# Patient Record
(demographics unavailable — no encounter records)

---

## 2024-12-18 NOTE — PHYSICAL EXAM
[Alert] : alert [No Acute Distress] : no acute distress [Normocephalic] : normocephalic [Anterior [place] Closed] : anterior fontanelle closed [Red Reflex Bilateral] : red reflex bilateral [PERRL] : PERRL [Normally Placed Ears] : normally placed ears [Auricles Well Formed] : auricles well formed [Clear Tympanic membranes with present light reflex and bony landmarks] : clear tympanic membranes with present light reflex and bony landmarks [No Discharge] : no discharge [Nares Patent] : nares patent [Palate Intact] : palate intact [Uvula Midline] : uvula midline [Tooth Eruption] : tooth eruption  [Supple, full passive range of motion] : supple, full passive range of motion [No Palpable Masses] : no palpable masses [Symmetric Chest Rise] : symmetric chest rise [Clear to Auscultation Bilaterally] : clear to auscultation bilaterally [Regular Rate and Rhythm] : regular rate and rhythm [S1, S2 present] : S1, S2 present [No Murmurs] : no murmurs [+2 Femoral Pulses] : +2 femoral pulses [Soft] : soft [NonTender] : non tender [Non Distended] : non distended [Normoactive Bowel Sounds] : normoactive bowel sounds [No Hepatomegaly] : no hepatomegaly [No Splenomegaly] : no splenomegaly [Central Urethral Opening] : central urethral opening [Testicles Descended Bilaterally] : testicles descended bilaterally [Patent] : patent [Normally Placed] : normally placed [No Abnormal Lymph Nodes Palpated] : no abnormal lymph nodes palpated [No Clavicular Crepitus] : no clavicular crepitus [Symmetric Buttocks Creases] : symmetric buttocks creases [No Spinal Dimple] : no spinal dimple [NoTuft of Hair] : no tuft of hair [Cranial Nerves Grossly Intact] : cranial nerves grossly intact [de-identified] : flaty nevus on nipple

## 2024-12-18 NOTE — DISCUSSION/SUMMARY
[FreeTextEntry1] : Screenings per protocol:  [facility] [place]-Acoustic Emissions Testing for Hearing [address]6076  Amblyopia Screen  84181  [facility]  CBC and Lead   Oral Screen 01944   Reviewed options for receiving the appropriate amount of Fluoride potentially through diet, some toothpaste products, or purchased drinks that may unknowingly contain fluoride reviewed. [facility] does not have fluoride in its water supply, there for supplementation with fluoride may be important to promote strong enamel development. However, too much fluoride can cause fluorosis and is a different i.e.significant problem as well. Appropriate brushing for age reviewed, but it should not become a fight. Oral hygiene includes avoidance of triggers for caries such as bottles, appropriate brushing, avoiding sharing pacifiers, discontinuing pacifiers, avoiding sticky sugar based products. Dental referral recommended at 2 either if if the child may cooperate, or regardless of cooperation if there are signs of tooth or gum disease. Otherwise, dental referral by 3 years.     Milk options and healthy range of intake reviewed.. Continue table foods, 3 meals with 2-3 snacks per day.  Brush teeth twice a day with soft toothbrush. Recommend visit to dentist.  Put toddler to sleep in own bed. Help toddler to maintain consistent daily routines and sleep schedule.  Toilet training discussed.  Ensure home is safe. Use consistent, positive discipline/accountabilty Read aloud to toddler.  Limit screen time per age appropriate.  As per car seat 's guidelines, use foward-facing car seat in back seat of car. Switch to booster seat when child reaches highest weight/height for seat. Child needs to ride in a belt-positioning booster seat until  4 feet 9 inches has been reached and are between 8 and 12 years of age [email or web address] as an online reference  Nipple lesion is expanding rapidly per mom and dad, refer. Appears benign  Flu refused Coovid Refused

## 2025-07-03 NOTE — HISTORY OF PRESENT ILLNESS
[FreeTextEntry1] : NP- Lesion on chest, rash  [de-identified] : Lisandro is a 1yo M, accompanied by his mother and father, presenting for evaluation of the followin. Lesion on the left nipple, present since birth, increased in size but mom reports is growing proportionally with him. No associated symptoms. Patient's mother is uncertain of family history of skin cancer, potentially maternal grandfather with melanoma vs NMSC, diagnosed within the past couple of years.   2. Rash on the posterior thighs, present for months, itchy.  S: Dove sensitive for baby, also taking bubble baths D: Tide free and clear, denies use of any fabric softener, perfume, dryer sheets M: Tubby time  3. Lesion on chest, medial to the left nipple and has been evaluated by pediatrician previously, diagnosed as cutaneous mastocytosis. Mom reports it was previously redder and more irritated but as of recently it is brown and doesn't flare up. No associated itch at present.   4. Bumps on bilateral arms, asymptomatic. Parents are wondering if the bumps require treatment.   5. Mom reports that patient has been pulling at his right nipple when he gets excited or anxious, no bleeding noted. Concerned because it appears more red than before.

## 2025-07-03 NOTE — PHYSICAL EXAM
[Alert] : alert [Well Nourished] : well nourished [Conjunctiva Non-injected] : conjunctiva non-injected [No Visual Lymphadenopathy] : no visual  lymphadenopathy [No Clubbing] : no clubbing [No Edema] : no edema [No Bromhidrosis] : no bromhidrosis [No Chromhidrosis] : no chromhidrosis [Full Body Skin Exam Performed] : performed [FreeTextEntry3] : -Left nipple with small ~3-4mm linear brown papule  -Right nipple erythema  -Accessory nipple of left chest  -Left medial chest with ~1cm tan patch, surrounding circumferential hypopigmentation -Scattered pink papules on the posterior upper arms bilaterally -Pink, rough patches on posterior upper thighs

## 2025-07-03 NOTE — CONSULT LETTER
[Dear  ___] : Dear  [unfilled], [Consult Letter:] : I had the pleasure of evaluating your patient, [unfilled]. [Please see my note below.] : Please see my note below. [Consult Closing:] : Thank you very much for allowing me to participate in the care of this patient.  If you have any questions, please do not hesitate to contact me. [Sincerely,] : Sincerely, [FreeTextEntry3] : Shani Mitchell MD Pediatric Dermatology [facility]

## 2025-07-03 NOTE — ASSESSMENT
[Use of independent historian: [ enter independent historian's relationship to patient ] :____] : As the patient was unable to provide a complete and reliable history, I obtained clinical history from the patient's [unfilled] [FreeTextEntry1] : #Benign Nevus, Left Nipple -Counseling and reassurance provided  #Atopic Dermatitis, Bilateral lower extremities- Mild, not at treatment goal - Counseling and reassurance provided - Treatment 1. Start hydrocortisone 2.5% ointment twice a day to affected areas, avoiding eyes and groin 2. Start Tacrolimus 0.03% ointment twice a day to affected areas as needed  3. Discussed gentle skin care regimen including short, lukewarm showers, liberal application of moisturizers, and use of fragrance-free products.   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 CeraVe cleanser or Aquaphor)   - Use soap only on areas truly needed (underarms,groin,buttocks,fold areas, feet, face, hair)   - Pat off excess water and put moisturizer on immediately (within 3 min.)               Good moisturizing choices include:                        1. Cetaphil cream (not baby Cetaphil)                       2. CeraVe cream                        3. Vanicream cream                        4. Aquaphor ointment                        5. Vaseline ointment                        6. CeraVe ointment  - A moisturizer should always be applied after showering or bathing, but may be applied as many additional times as is necessary.   #Erythema of R Nipple -Reassured no concerning findings on exam -Discussed techniques to minimize trauma to the area including reducing access to the site by wearing night-time clothing backwards (zipper in back) and redirecting patient with use of toys/stuffed animals, etc.   #Accessory Nipple, Left  -Counseling and reassurance provided   RTC 3 Months   Hilda Norris MD  PGY-2, [facility] Dermatology Resident